# Patient Record
Sex: FEMALE | Race: WHITE | NOT HISPANIC OR LATINO | Employment: UNEMPLOYED | URBAN - METROPOLITAN AREA
[De-identification: names, ages, dates, MRNs, and addresses within clinical notes are randomized per-mention and may not be internally consistent; named-entity substitution may affect disease eponyms.]

---

## 2024-05-27 ENCOUNTER — TELEPHONE (OUTPATIENT)
Dept: URGENT CARE | Facility: CLINIC | Age: 34
End: 2024-05-27

## 2024-05-27 ENCOUNTER — OFFICE VISIT (OUTPATIENT)
Dept: URGENT CARE | Facility: CLINIC | Age: 34
End: 2024-05-27
Payer: COMMERCIAL

## 2024-05-27 VITALS
TEMPERATURE: 99 F | OXYGEN SATURATION: 97 % | BODY MASS INDEX: 25.91 KG/M2 | HEIGHT: 63 IN | DIASTOLIC BLOOD PRESSURE: 86 MMHG | RESPIRATION RATE: 20 BRPM | WEIGHT: 146.25 LBS | HEART RATE: 84 BPM | SYSTOLIC BLOOD PRESSURE: 134 MMHG

## 2024-05-27 DIAGNOSIS — R30.0 BURNING WITH URINATION: ICD-10-CM

## 2024-05-27 DIAGNOSIS — R31.9 URINARY TRACT INFECTION WITH HEMATURIA, SITE UNSPECIFIED: Primary | ICD-10-CM

## 2024-05-27 DIAGNOSIS — N39.0 URINARY TRACT INFECTION WITH HEMATURIA, SITE UNSPECIFIED: Primary | ICD-10-CM

## 2024-05-27 LAB
BILIRUB UR QL STRIP: NEGATIVE
GLUCOSE UR QL STRIP: NEGATIVE
KETONES UR QL STRIP: NEGATIVE
LEUKOCYTE ESTERASE UR QL STRIP: POSITIVE
PH, POC UA: 7
POC BLOOD, URINE: POSITIVE
POC NITRATES, URINE: NEGATIVE
PROT UR QL STRIP: NEGATIVE
SP GR UR STRIP: 1.01 (ref 1–1.03)
UROBILINOGEN UR STRIP-ACNC: NORMAL (ref 0.1–1.1)

## 2024-05-27 PROCEDURE — 81003 URINALYSIS AUTO W/O SCOPE: CPT | Mod: QW,S$GLB,,

## 2024-05-27 PROCEDURE — 99203 OFFICE O/P NEW LOW 30 MIN: CPT | Mod: S$GLB,,,

## 2024-05-27 RX ORDER — NITROFURANTOIN 25; 75 MG/1; MG/1
100 CAPSULE ORAL 2 TIMES DAILY
Qty: 14 CAPSULE | Refills: 0 | Status: SHIPPED | OUTPATIENT
Start: 2024-05-27 | End: 2024-06-03

## 2024-05-27 NOTE — PROGRESS NOTES
"Subjective:       Patient ID: Patience Roberts is a 34 y.o. female.    Vitals:  height is 5' 3" (1.6 m) and weight is 66.3 kg (146 lb 4.4 oz). Her oral temperature is 98.5 °F (36.9 °C). Her blood pressure is 134/86 and her pulse is 84. Her respiration is 20 and oxygen saturation is 97%.     Chief Complaint: burning with urination    This is a 34 y.o. female who presents today with a chief complaint of burning with urination and states she sees a few clots since yesterday.  States she has increased her water intake.   Patient presents with:  burning with urination             Constitution: Negative.   HENT: Negative.     Neck: neck negative.   Cardiovascular: Negative.    Eyes: Negative.    Respiratory: Negative.     Gastrointestinal: Negative.    Endocrine: negative.   Genitourinary:  Positive for dysuria, frequency and urgency.   Musculoskeletal: Negative.    Skin: Negative.    Allergic/Immunologic: Negative.    Neurological: Negative.    Hematologic/Lymphatic: Negative.    Psychiatric/Behavioral: Negative.             Objective:      Physical Exam   Constitutional: She is oriented to person, place, and time.   HENT:   Head: Normocephalic and atraumatic.   Nose: Nose normal.   Eyes: Conjunctivae are normal. Pupils are equal, round, and reactive to light. Extraocular movement intact   Neck: Neck supple.   Cardiovascular: Normal rate and normal pulses.   Pulmonary/Chest: Effort normal.   Abdominal: Normal appearance.   Musculoskeletal: Normal range of motion.         General: Normal range of motion.   Neurological: no focal deficit. She is alert, oriented to person, place, and time and at baseline.   Skin: Skin is warm.   Psychiatric: Her behavior is normal. Mood, judgment and thought content normal.   Nursing note and vitals reviewed.        Past medical history and current medications reviewed.       Assessment:           1. Urinary tract infection with hematuria, site unspecified    2. Burning with urination      " Spoke with pt, notes small amount of drainage present yesterday and today. No other signs/symptoms of infection present. Explained that it can be normal for drainage to be present and to notify us if any new symptoms present. She will keep her 8/29 appointment for now, but will call back to cancel if not needed.       Results for orders placed or performed in visit on 05/27/24   POCT Urinalysis, Dipstick, Automated, W/O Scope   Result Value Ref Range    POC Blood, Urine Positive (A) Negative    POC Bilirubin, Urine Negative Negative    POC Urobilinogen, Urine normal 0.1 - 1.1    POC Ketones, Urine Negative Negative    POC Protein, Urine Negative Negative    POC Nitrates, Urine Negative Negative    POC Glucose, Urine Negative Negative    pH, UA 7.0     POC Specific Gravity, Urine 1.010 1.003 - 1.029    POC Leukocytes, Urine Positive (A) Negative        Plan:         Urinary tract infection with hematuria, site unspecified  -     nitrofurantoin, macrocrystal-monohydrate, (MACROBID) 100 MG capsule; Take 1 capsule (100 mg total) by mouth 2 (two) times daily. for 7 days  Dispense: 14 capsule; Refill: 0    Burning with urination  -     POCT Urinalysis, Dipstick, Automated, W/O Scope             Patient Instructions   Recommend increased intake of fluids.    If you were prescribed antibiotics take them as directed to completion.    You may take azo OTC as directed for painful urination unless you were prescribe something for these symptoms by the provider.  Follow-up with PCP or return to clinic if no improvement in symptoms over the next 3 days.

## 2024-05-27 NOTE — TELEPHONE ENCOUNTER
Patient called stating [facility] was closed and requested medication be called into another pharmacy. I called [facility] and verified they closed at 6. I called the patient back to let her know they were open. Patient picking medication up at [facility], and I did not call and give verbal to [facility] per patient request.

## 2024-06-08 ENCOUNTER — OFFICE VISIT (OUTPATIENT)
Dept: URGENT CARE | Facility: CLINIC | Age: 34
End: 2024-06-08
Payer: COMMERCIAL

## 2024-06-08 VITALS
DIASTOLIC BLOOD PRESSURE: 93 MMHG | RESPIRATION RATE: 17 BRPM | TEMPERATURE: 98 F | OXYGEN SATURATION: 98 % | BODY MASS INDEX: 26.05 KG/M2 | SYSTOLIC BLOOD PRESSURE: 142 MMHG | WEIGHT: 147 LBS | HEART RATE: 87 BPM | HEIGHT: 63 IN

## 2024-06-08 DIAGNOSIS — L50.9 URTICARIA: Primary | ICD-10-CM

## 2024-06-08 PROCEDURE — 99213 OFFICE O/P EST LOW 20 MIN: CPT | Mod: S$GLB,,, | Performed by: NURSE PRACTITIONER

## 2024-06-08 RX ORDER — PREDNISONE 20 MG/1
20 TABLET ORAL DAILY
Qty: 5 TABLET | Refills: 0 | Status: SHIPPED | OUTPATIENT
Start: 2024-06-08 | End: 2024-06-13

## 2024-06-08 RX ORDER — MINERAL OIL
180 ENEMA (ML) RECTAL DAILY
Qty: 7 TABLET | Refills: 0 | Status: SHIPPED | OUTPATIENT
Start: 2024-06-08 | End: 2024-06-15

## 2024-06-08 NOTE — PROGRESS NOTES
"Subjective:      Patient ID: Patience Roberts is a 34 y.o. female.    Vitals:  height is 5' 3" (1.6 m) and weight is 66.7 kg (147 lb). Her oral temperature is 97.6 °F (36.4 °C). Her blood pressure is 142/93 (abnormal) and her pulse is 87. Her respiration is 17 and oxygen saturation is 98%.     Chief Complaint: Rash (Pt states that her symptoms started on 2 days ago. Patient states that she just came off antibiotics from a uti. Patient states that her symptoms are the following: hive like rash bilateral upper and lower extremities, torso posterior/anterior. Pt states that she has used benadryl but it comes back.)    This is a 34 y.o. female who presents today with a chief complaint of rash. She explains that she was seen here on 5/27/24 and prescribed Macrobid. She reports that rash started after she began taking Macrobid. Pt states that she has used benadryl but rash eventually returns within 6 hours.         Rash  This is a new problem. The current episode started yesterday. The problem has been gradually improving since onset. The affected locations include the torso, back, abdomen, left arm, left elbow, left foot, right arm, left lower leg, left upper leg, right upper leg, right lower leg and right foot. The rash is characterized by itchiness, redness and swelling. She was exposed to a new medication. Treatments tried: benadryl. The treatment provided moderate relief.       Skin:  Positive for rash.      Objective:     Physical Exam   Constitutional: She is oriented to person, place, and time.  Non-toxic appearance. She does not appear ill. No distress. normal  HENT:   Head: Normocephalic and atraumatic.   Mouth/Throat: Uvula is midline, oropharynx is clear and moist and mucous membranes are normal. Mucous membranes are moist. No posterior oropharyngeal erythema. Oropharynx is clear.   Eyes: Conjunctivae are normal. Extraocular movement intact   Neck: Neck supple.   Cardiovascular: Normal rate, regular rhythm, normal " heart sounds and normal pulses.   Pulmonary/Chest: Effort normal and breath sounds normal.   Abdominal: Normal appearance.   Musculoskeletal: Normal range of motion.         General: Normal range of motion.   Neurological: She is alert and oriented to person, place, and time.   Skin: Skin is warm, dry and rash.         Comments: Mild urticarial rash to bilateral arms and legs.   Psychiatric: Her behavior is normal.   Vitals reviewed.      Assessment:     1. Urticaria        Plan:       Urticaria  -     predniSONE (DELTASONE) 20 MG tablet; Take 1 tablet (20 mg total) by mouth once daily. for 5 days  Dispense: 5 tablet; Refill: 0  -     fexofenadine (ALLEGRA) 180 MG tablet; Take 1 tablet (180 mg total) by mouth once daily. for 7 days  Dispense: 7 tablet; Refill: 0      INSTRUCTIONS  Meds as prescribed. Benadryl as instructed. Follow up as advised. To ER for worsening of symptoms or for any new symptoms.